# Patient Record
Sex: FEMALE | Race: BLACK OR AFRICAN AMERICAN | NOT HISPANIC OR LATINO
[De-identification: names, ages, dates, MRNs, and addresses within clinical notes are randomized per-mention and may not be internally consistent; named-entity substitution may affect disease eponyms.]

---

## 2024-02-20 ENCOUNTER — APPOINTMENT (OUTPATIENT)
Dept: FAMILY MEDICINE | Facility: CLINIC | Age: 34
End: 2024-02-20
Payer: COMMERCIAL

## 2024-02-20 VITALS
DIASTOLIC BLOOD PRESSURE: 84 MMHG | WEIGHT: 173 LBS | OXYGEN SATURATION: 100 % | BODY MASS INDEX: 25.62 KG/M2 | TEMPERATURE: 98 F | SYSTOLIC BLOOD PRESSURE: 124 MMHG | HEIGHT: 69 IN | HEART RATE: 70 BPM

## 2024-02-20 DIAGNOSIS — N64.4 MASTODYNIA: ICD-10-CM

## 2024-02-20 DIAGNOSIS — Z87.2 PERSONAL HISTORY OF DISEASES OF THE SKIN AND SUBCUTANEOUS TISSUE: ICD-10-CM

## 2024-02-20 DIAGNOSIS — Z00.00 ENCOUNTER FOR GENERAL ADULT MEDICAL EXAMINATION W/OUT ABNORMAL FINDINGS: ICD-10-CM

## 2024-02-20 DIAGNOSIS — Z82.49 FAMILY HISTORY OF ISCHEMIC HEART DISEASE AND OTHER DISEASES OF THE CIRCULATORY SYSTEM: ICD-10-CM

## 2024-02-20 DIAGNOSIS — D21.9 BENIGN NEOPLASM OF CONNECTIVE AND OTHER SOFT TISSUE, UNSPECIFIED: ICD-10-CM

## 2024-02-20 DIAGNOSIS — Z80.1 FAMILY HISTORY OF MALIGNANT NEOPLASM OF TRACHEA, BRONCHUS AND LUNG: ICD-10-CM

## 2024-02-20 DIAGNOSIS — R92.8 OTHER ABNORMAL AND INCONCLUSIVE FINDINGS ON DIAGNOSTIC IMAGING OF BREAST: ICD-10-CM

## 2024-02-20 DIAGNOSIS — Z83.49 FAMILY HISTORY OF OTHER ENDOCRINE, NUTRITIONAL AND METABOLIC DISEASES: ICD-10-CM

## 2024-02-20 DIAGNOSIS — N92.0 EXCESSIVE AND FREQUENT MENSTRUATION WITH REGULAR CYCLE: ICD-10-CM

## 2024-02-20 PROCEDURE — 99385 PREV VISIT NEW AGE 18-39: CPT | Mod: 25

## 2024-02-20 NOTE — HISTORY OF PRESENT ILLNESS
[FreeTextEntry1] : New CPE [de-identified] : Patient presents today for a CPE.  Vaccinations: defers Flu, covid19 x2, Tdap -- believes she UTD  Last Papsmear: states it was 2 years ago; LMP: 2/2/24 -- admits to regular menstrual cycle   UTD with routine dental and due for routine eye exams  Follows with dermatology for hx of folliculitis

## 2024-02-20 NOTE — HEALTH RISK ASSESSMENT
[Yes] : Yes [2 - 4 times a month (2 pts)] : 2-4 times a month (2 points) [1 or 2 (0 pts)] : 1 or 2 (0 points) [Never (0 pts)] : Never (0 points) [No] : In the past 12 months have you used drugs other than those required for medical reasons? No [Little interest or pleasure doing things] : 1) Little interest or pleasure doing things [Feeling down, depressed, or hopeless] : 2) Feeling down, depressed, or hopeless [0] : 2) Feeling down, depressed, or hopeless: Not at all (0) [PHQ-2 Negative - No further assessment needed] : PHQ-2 Negative - No further assessment needed [With Significant Other] : lives with significant other [Employed] : employed [] :  [Significant Other] : lives with significant other [Never] : Never [Audit-CScore] : 2 [de-identified] : jogs 2 times a week [de-identified] : feels it is balanced [[id number]] : 0 [Sexually Active] : not sexually active [High Risk Behavior] : no high risk behavior [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

## 2024-02-20 NOTE — REVIEW OF SYSTEMS
[Fever] : no fever [Chills] : no chills [Pain] : no pain [Vision Problems] : no vision problems [Nasal Discharge] : no nasal discharge [Sore Throat] : no sore throat [Chest Pain] : no chest pain [Palpitations] : no palpitations [Shortness Of Breath] : no shortness of breath [Cough] : no cough [Dyspnea on Exertion] : no dyspnea on exertion [Abdominal Pain] : no abdominal pain [Constipation] : no constipation [Diarrhea] : diarrhea [Vomiting] : no vomiting [Melena] : no melena [Dysuria] : no dysuria [Hematuria] : no hematuria [Frequency] : no frequency [Muscle Weakness] : no muscle weakness [Muscle Pain] : no muscle pain [Itching] : no itching [Skin Rash] : no skin rash [Headache] : no headache [Dizziness] : no dizziness [Insomnia] : no insomnia [Depression] : no depression [Easy Bleeding] : no easy bleeding [Easy Bruising] : no easy bruising [FreeTextEntry9] : left breast pain

## 2024-02-20 NOTE — PLAN
[FreeTextEntry1] : 1. CPE - routine labs ordered (CBC, CMP, Lipid panel, TSH, A1c) - UTD with papsmear - defers Influenza vaccination - Depression screen with PHQ2 negative - UTD with routine dental and due for routine eye exams  2. Breast Pain - Breast US was ordered to assess pt's c/o left breast tenderness and lump - Breast US today significant for lobulated solid mass noted in the left breast -- US guided core biopsy was recommended -- results were discussed with the pt and biopsy was ordered  3. Menorrhagia  - suspect 2/2 hx of fibroids - now off OCP's - CBC, and iron studies ordered - recommended gyn follow up

## 2024-02-20 NOTE — PHYSICAL EXAM
[No Acute Distress] : no acute distress [Well Nourished] : well nourished [Well Developed] : well developed [Well-Appearing] : well-appearing [Normal Sclera/Conjunctiva] : normal sclera/conjunctiva [PERRL] : pupils equal round and reactive to light [EOMI] : extraocular movements intact [Normal Outer Ear/Nose] : the outer ears and nose were normal in appearance [Normal Oropharynx] : the oropharynx was normal [Normal TMs] : both tympanic membranes were normal [Normal Nasal Mucosa] : the nasal mucosa was normal [No Lymphadenopathy] : no lymphadenopathy [Supple] : supple [No Respiratory Distress] : no respiratory distress  [No Accessory Muscle Use] : no accessory muscle use [Clear to Auscultation] : lungs were clear to auscultation bilaterally [Normal Rate] : normal rate  [Regular Rhythm] : with a regular rhythm [Normal S1, S2] : normal S1 and S2 [No Edema] : there was no peripheral edema [No Nipple Discharge] : no nipple discharge [No Axillary Lymphadenopathy] : no axillary lymphadenopathy [Soft] : abdomen soft [Non Tender] : non-tender [Non-distended] : non-distended [No Masses] : no abdominal mass palpated [No HSM] : no HSM [Normal Bowel Sounds] : normal bowel sounds [Normal Posterior Cervical Nodes] : no posterior cervical lymphadenopathy [Normal Anterior Cervical Nodes] : no anterior cervical lymphadenopathy [No CVA Tenderness] : no CVA  tenderness [No Spinal Tenderness] : no spinal tenderness [No Joint Swelling] : no joint swelling [Grossly Normal Strength/Tone] : grossly normal strength/tone [No Rash] : no rash [Coordination Grossly Intact] : coordination grossly intact [Normal Gait] : normal gait [Normal Affect] : the affect was normal [Normal Insight/Judgement] : insight and judgment were intact [de-identified] : chaperone present for exam -- left breast TTP

## 2024-02-22 LAB
ALBUMIN SERPL ELPH-MCNC: 4.5 G/DL
ALP BLD-CCNC: 63 U/L
ALT SERPL-CCNC: 27 U/L
ANION GAP SERPL CALC-SCNC: 11 MMOL/L
AST SERPL-CCNC: 31 U/L
BILIRUB SERPL-MCNC: 0.5 MG/DL
BUN SERPL-MCNC: 11 MG/DL
CALCIUM SERPL-MCNC: 9.3 MG/DL
CHLORIDE SERPL-SCNC: 104 MMOL/L
CHOLEST SERPL-MCNC: 134 MG/DL
CO2 SERPL-SCNC: 24 MMOL/L
CREAT SERPL-MCNC: 0.6 MG/DL
EGFR: 120 ML/MIN/1.73M2
ESTIMATED AVERAGE GLUCOSE: 100 MG/DL
FERRITIN SERPL-MCNC: 26 NG/ML
GLUCOSE SERPL-MCNC: 86 MG/DL
HBA1C MFR BLD HPLC: 5.1 %
HCT VFR BLD CALC: 38.4 %
HDLC SERPL-MCNC: 54 MG/DL
HGB BLD-MCNC: 12.1 G/DL
IRON SATN MFR SERPL: 36 %
IRON SERPL-MCNC: 122 UG/DL
LDLC SERPL CALC-MCNC: 71 MG/DL
MCHC RBC-ENTMCNC: 27.1 PG
MCHC RBC-ENTMCNC: 31.5 GM/DL
MCV RBC AUTO: 86.1 FL
NONHDLC SERPL-MCNC: 80 MG/DL
PLATELET # BLD AUTO: 219 K/UL
POTASSIUM SERPL-SCNC: 4.2 MMOL/L
PROT SERPL-MCNC: 6.9 G/DL
RBC # BLD: 4.46 M/UL
RBC # FLD: 13.4 %
SODIUM SERPL-SCNC: 139 MMOL/L
TIBC SERPL-MCNC: 340 UG/DL
TRIGL SERPL-MCNC: 36 MG/DL
TSH SERPL-ACNC: 1.11 UIU/ML
UIBC SERPL-MCNC: 218 UG/DL
WBC # FLD AUTO: 6.71 K/UL

## 2024-03-05 ENCOUNTER — APPOINTMENT (OUTPATIENT)
Dept: OBGYN | Facility: CLINIC | Age: 34
End: 2024-03-05
Payer: COMMERCIAL

## 2024-03-05 ENCOUNTER — TRANSCRIPTION ENCOUNTER (OUTPATIENT)
Age: 34
End: 2024-03-05

## 2024-03-05 ENCOUNTER — ASOB RESULT (OUTPATIENT)
Age: 34
End: 2024-03-05

## 2024-03-05 VITALS
RESPIRATION RATE: 16 BRPM | HEIGHT: 69 IN | OXYGEN SATURATION: 100 % | BODY MASS INDEX: 25.62 KG/M2 | WEIGHT: 173 LBS | SYSTOLIC BLOOD PRESSURE: 115 MMHG | DIASTOLIC BLOOD PRESSURE: 77 MMHG | TEMPERATURE: 97.6 F | HEART RATE: 64 BPM

## 2024-03-05 DIAGNOSIS — N89.8 OTHER SPECIFIED NONINFLAMMATORY DISORDERS OF VAGINA: ICD-10-CM

## 2024-03-05 DIAGNOSIS — Z01.419 ENCOUNTER FOR GYNECOLOGICAL EXAMINATION (GENERAL) (ROUTINE) W/OUT ABNORMAL FINDINGS: ICD-10-CM

## 2024-03-05 PROCEDURE — 99385 PREV VISIT NEW AGE 18-39: CPT

## 2024-03-05 PROCEDURE — ZZZZZ: CPT

## 2024-03-05 PROCEDURE — 76830 TRANSVAGINAL US NON-OB: CPT

## 2024-03-05 RX ORDER — NORETHINDRONE ACETATE AND ETHINYL ESTRADIOL AND FERROUS FUMARATE 1MG-20(21)
1-20 KIT ORAL DAILY
Qty: 28 | Refills: 3 | Status: ACTIVE | COMMUNITY
Start: 2024-03-05 | End: 1900-01-01

## 2024-03-05 RX ORDER — FLUCONAZOLE 150 MG/1
150 TABLET ORAL
Qty: 1 | Refills: 1 | Status: ACTIVE | COMMUNITY
Start: 2024-03-05 | End: 1900-01-01

## 2024-03-11 LAB
CYTOLOGY CVX/VAG DOC THIN PREP: NORMAL
HPV HIGH+LOW RISK DNA PNL CVX: NOT DETECTED

## 2024-03-18 NOTE — HISTORY OF PRESENT ILLNESS
[FreeTextEntry1] : ALYSON AQUINO 32 YO, G 0, presents for Annual Patient reports heavy vaginal bleeding with every menstruation. Patient reports vaginal itching every since completion of antibiotic.  h/o breast pain with US guided core needle biopsy on 2/26/2024==>fibroadenoma with calcifications==> FU breast US in 6 months  LMP: 02/29/24 - Regular menses Sexually active, not using contraceptive. Medication: Clindamycin No family history of breast cancer Last PAP: 2022 WNL Had left breast core biopsy 02/26/24 GYN HX: Fibroids Nonsmoker

## 2024-03-18 NOTE — PHYSICAL EXAM
[Appropriately responsive] : appropriately responsive [Alert] : alert [Examination Of The Breasts] : a normal appearance [No Acute Distress] : no acute distress [No Masses] : no breast masses were palpable [Labia Minora] : normal [Labia Majora] : normal [Discharge] : a  ~M vaginal discharge was present [Moderate] : moderate [Cheesy] : cheesy [White] : white [Normal] : normal [Uterine Adnexae] : normal [FreeTextEntry8] :  no adnexal tenderness, no adnexal fullness, no CMT

## 2024-03-18 NOTE — COUNSELING
[Nutrition/ Exercise/ Weight Management] : nutrition, exercise, weight management [Sunscreen] : sunscreen [Body Image] : body image [Contraception/ Emergency Contraception/ Safe Sexual Practices] : contraception, emergency contraception, safe sexual practices [Breast Self Exam] : breast self exam

## 2024-03-18 NOTE — PLAN
[FreeTextEntry1] :  Annual exam: pap smear, gonorrhea/chlamydia/trichomonas Vulvovaginal candidiasis ==> eRx  Fluconazole Menorrhagia ==> eRx Junel  Follow up in 1 year or PRN

## 2025-03-29 ENCOUNTER — RX RENEWAL (OUTPATIENT)
Age: 35
End: 2025-03-29

## 2025-08-13 ENCOUNTER — NON-APPOINTMENT (OUTPATIENT)
Age: 35
End: 2025-08-13

## 2025-08-19 ENCOUNTER — APPOINTMENT (OUTPATIENT)
Dept: SURGICAL ONCOLOGY | Facility: CLINIC | Age: 35
End: 2025-08-19
Payer: COMMERCIAL

## 2025-08-19 ENCOUNTER — NON-APPOINTMENT (OUTPATIENT)
Age: 35
End: 2025-08-19

## 2025-08-19 VITALS
HEART RATE: 62 BPM | DIASTOLIC BLOOD PRESSURE: 82 MMHG | BODY MASS INDEX: 22.07 KG/M2 | WEIGHT: 149 LBS | OXYGEN SATURATION: 100 % | HEIGHT: 69 IN | SYSTOLIC BLOOD PRESSURE: 126 MMHG

## 2025-08-19 DIAGNOSIS — N64.4 MASTODYNIA: ICD-10-CM

## 2025-08-19 DIAGNOSIS — R92.8 OTHER ABNORMAL AND INCONCLUSIVE FINDINGS ON DIAGNOSTIC IMAGING OF BREAST: ICD-10-CM

## 2025-08-19 PROCEDURE — 99205 OFFICE O/P NEW HI 60 MIN: CPT

## 2025-08-28 ENCOUNTER — APPOINTMENT (OUTPATIENT)
Dept: OBGYN | Facility: CLINIC | Age: 35
End: 2025-08-28
Payer: COMMERCIAL

## 2025-08-28 VITALS
TEMPERATURE: 97.8 F | BODY MASS INDEX: 22.22 KG/M2 | HEIGHT: 69 IN | DIASTOLIC BLOOD PRESSURE: 84 MMHG | WEIGHT: 150 LBS | OXYGEN SATURATION: 100 % | HEART RATE: 70 BPM | SYSTOLIC BLOOD PRESSURE: 128 MMHG | RESPIRATION RATE: 16 BRPM

## 2025-08-28 DIAGNOSIS — D21.9 BENIGN NEOPLASM OF CONNECTIVE AND OTHER SOFT TISSUE, UNSPECIFIED: ICD-10-CM

## 2025-08-28 DIAGNOSIS — Z01.419 ENCOUNTER FOR GYNECOLOGICAL EXAMINATION (GENERAL) (ROUTINE) W/OUT ABNORMAL FINDINGS: ICD-10-CM

## 2025-08-28 PROCEDURE — 99395 PREV VISIT EST AGE 18-39: CPT

## 2025-08-28 RX ORDER — NORETHINDRONE ACETATE AND ETHINYL ESTRADIOL AND FERROUS FUMARATE 1MG-20(21)
1-20 KIT ORAL
Qty: 1 | Refills: 12 | Status: ACTIVE | COMMUNITY
Start: 2025-08-28 | End: 1900-01-01

## 2025-08-29 LAB
C TRACH RRNA SPEC QL NAA+PROBE: NOT DETECTED
N GONORRHOEA RRNA SPEC QL NAA+PROBE: NOT DETECTED
SOURCE TP AMPLIFICATION: NORMAL

## 2025-09-02 ENCOUNTER — APPOINTMENT (OUTPATIENT)
Dept: OBGYN | Facility: CLINIC | Age: 35
End: 2025-09-02
Payer: COMMERCIAL

## 2025-09-02 ENCOUNTER — ASOB RESULT (OUTPATIENT)
Age: 35
End: 2025-09-02

## 2025-09-02 PROCEDURE — 76830 TRANSVAGINAL US NON-OB: CPT

## 2025-09-03 LAB — CYTOLOGY CVX/VAG DOC THIN PREP: NORMAL

## 2025-09-18 ENCOUNTER — APPOINTMENT (OUTPATIENT)
Dept: OBGYN | Facility: CLINIC | Age: 35
End: 2025-09-18
Payer: COMMERCIAL

## 2025-09-18 VITALS
WEIGHT: 156 LBS | TEMPERATURE: 98 F | DIASTOLIC BLOOD PRESSURE: 64 MMHG | BODY MASS INDEX: 23.11 KG/M2 | HEART RATE: 71 BPM | HEIGHT: 69 IN | SYSTOLIC BLOOD PRESSURE: 124 MMHG | OXYGEN SATURATION: 100 % | RESPIRATION RATE: 16 BRPM

## 2025-09-18 DIAGNOSIS — N92.0 EXCESSIVE AND FREQUENT MENSTRUATION WITH REGULAR CYCLE: ICD-10-CM

## 2025-09-18 DIAGNOSIS — D21.9 BENIGN NEOPLASM OF CONNECTIVE AND OTHER SOFT TISSUE, UNSPECIFIED: ICD-10-CM

## 2025-09-18 PROCEDURE — 99213 OFFICE O/P EST LOW 20 MIN: CPT
